# Patient Record
(demographics unavailable — no encounter records)

---

## 2017-04-26 NOTE — PHYS DOC
Past Medical History


Past Medical History:  Other


Additional Past Medical Histor:  nephrotic syndrome


Past Surgical History:  Other


Additional Past Surgical Histo:  2 kidney biopsy


Alcohol Use:  Occasionally


Drug Use:  None





Adult General


Chief Complaint


Chief Complaint:  ABDOMINAL PAIN





HPI


HPI


This is a 22-year-old male who has history of nephrotic syndrome who recently 

moved back to this area who's had significant left upper quadrant abdominal 

pain for the last day. Patient was seen at [facility] yesterday for his 

symptoms and had laboratory workup that was unremarkable. His pain is still 

persisted since that time. He states it is worse approximately 30 minutes after 

meals. At rest and upon my initial assessment in the room, the patient does not 

appear to be in any distress states his pain is fairly well controlled. He 

denies any drug or alcohol use. A dose of Lasix was given to him yesterday 

regarding his history of nephrotic syndrome follow-up with Dr. Fletcher was 

advised. He denies any urinary complaints. He denies any fever or chills.





Review of Systems


Review of Systems





Constitutional: Denies fever or chills []


Eyes: Denies change in visual acuity, redness, or eye pain []


HENT: Denies nasal congestion or sore throat []


Respiratory: Denies cough or shortness of breath []


Cardiovascular: No additional information not addressed in HPI []


GI: Has abdominal pain, has nausea, denies vomiting, bloody stools or diarrhea [

]


: Denies dysuria or hematuria []


Musculoskeletal: Denies back pain or joint pain []


Integument: Denies rash or skin lesions []


Neurologic: Denies headache, focal weakness or sensory changes []


Endocrine: Denies polyuria or polydipsia []





Current Medications


Current Medications





 Current Medications








 Medications


  (Trade)  Dose


 Ordered  Sig/Gabbi  Start Time


 Stop Time Status Last Admin


Dose Admin


 


 Multi-Ingredient


 Mouthwash/Gargle


  (Gi Cocktail


 Single Dose)  15 ml  1X  ONCE  4/26/17 11:45


 4/26/17 11:46 DC 4/26/17 11:55


15 ML











Allergies


Allergies





 Allergies








Coded Allergies Type Severity Reaction Last Updated Verified


 


  lisinopril Allergy Intermediate cough 4/26/17 Yes











Physical Exam


Physical Exam





Constitutional: Well developed, well nourished, no acute distress, non-toxic 

appearance. []


HENT: Normocephalic, atraumatic, bilateral external ears normal, oropharynx 

moist, no oral exudates, nose normal. []


Eyes: PERRLA, EOMI, conjunctiva normal, no discharge. [] 


Neck: Normal range of motion, no tenderness, supple, no stridor. [] 


Cardiovascular:Heart rate regular rhythm, no murmur []


Lungs & Thorax:  Bilateral breath sounds clear to auscultation []


Abdomen: Bowel sounds normal, soft, epigastrium and LUQ tenderness, no masses, 

no pulsatile masses. [] 


Skin: Warm, dry, no erythema, no rash. [] 


Back: No tenderness, no CVA tenderness. [] 


Extremities: No tenderness, no cyanosis, no clubbing, ROM intact, no edema. [] 


Neurologic: Alert and oriented X 3, normal motor function, normal sensory 

function, no focal deficits noted. []


Psychologic: Affect normal, judgement normal, mood normal. []





Current Patient Data


Vital Signs





 Vital Signs








  Date Time  Temp Pulse Resp B/P Pulse Ox O2 Delivery O2 Flow Rate FiO2


 


4/26/17 11:17 97.7 54 18 127/84 100 Room Air  





 97.7       











EKG


EKG


[]





Radiology/Procedures


Radiology/Procedures


[]





Course & Med Decision Making


Course & Med Decision Making


Pertinent Labs and Imaging studies reviewed. (See chart for details)





Upon my reassessment after GI cocktail, the patient's symptoms have vastly 

improved. Patient had significant workup yesterday at [facility] and I do not 

see an indication to repeat that at this time. His vital signs. Normal and he 

is in no distress. His symptoms have improved significantly GI cocktail and so I

'll be providing him a prescription for omeprazole as well as a GI follow-up as 

he could require endoscopy. Return precautions were provided and patient 

already has a prescription for Zofran at home and advised him to continue 

taking that as needed for any nausea as well as to maintain a bland diet and to 

avoid any drug or alcohol use.





Dragon Disclaimer


Dragon Disclaimer


This electronic medical record was generated, in whole or in part, using a 

voice recognition dictation system.





Departure


Departure


Impression:  


 Primary Impression:  


 LUQ abdominal pain


Disposition:  01 HOME, SELF-CARE


Condition:  IMPROVED


Referrals:  


NAGA CATALAN MD


Patient Instructions:  Abdominal Pain, Easy-to-Read





Additional Instructions:


Please follow-up with the GI doctor in the next 1-2 days as instructed and take 

your medications as needed. Continue to stay well-hydrated and eat a bland 

diet. Avoid any drugs or alcohol. Return to ER if you develop any worsening 

abdominal pain and vomiting.


Scripts


Pantoprazole Sodium (Protonix)20 Mg Tablet.dr20 Mg PO DAILY #10 TAB


   Prov:NAGA JUNIOR DO         4/26/17








NAGA JUNIOR DO Apr 26, 2017 12:02